# Patient Record
Sex: MALE | Race: WHITE
[De-identification: names, ages, dates, MRNs, and addresses within clinical notes are randomized per-mention and may not be internally consistent; named-entity substitution may affect disease eponyms.]

---

## 2019-01-15 ENCOUNTER — HOSPITAL ENCOUNTER (OUTPATIENT)
Dept: HOSPITAL 19 - MHCPAIN | Age: 69
End: 2019-01-15
Attending: ANESTHESIOLOGY
Payer: MEDICARE

## 2019-01-15 DIAGNOSIS — M47.812: ICD-10-CM

## 2019-01-15 DIAGNOSIS — G89.29: Primary | ICD-10-CM

## 2019-01-15 DIAGNOSIS — M54.12: ICD-10-CM

## 2019-01-15 DIAGNOSIS — M96.1: ICD-10-CM

## 2019-01-15 PROCEDURE — G0463 HOSPITAL OUTPT CLINIC VISIT: HCPCS

## 2021-02-11 ENCOUNTER — HOSPITAL ENCOUNTER (OUTPATIENT)
Dept: HOSPITAL 19 - COL.RAD | Age: 71
End: 2021-02-11
Attending: ORTHOPAEDIC SURGERY
Payer: MEDICARE

## 2021-02-11 DIAGNOSIS — M24.151: Primary | ICD-10-CM

## 2021-02-11 PROCEDURE — A9585 GADOBUTROL INJECTION: HCPCS

## 2021-05-27 ENCOUNTER — HOSPITAL ENCOUNTER (INPATIENT)
Dept: HOSPITAL 19 - COL.ER | Age: 71
LOS: 7 days | Discharge: NURSING, REHAB OR LONG-TERM CARE | DRG: 551 | End: 2021-06-03
Attending: INTERNAL MEDICINE | Admitting: INTERNAL MEDICINE
Payer: MEDICARE

## 2021-05-27 VITALS — SYSTOLIC BLOOD PRESSURE: 125 MMHG | TEMPERATURE: 97.7 F | HEART RATE: 90 BPM | DIASTOLIC BLOOD PRESSURE: 54 MMHG

## 2021-05-27 VITALS — WEIGHT: 135.36 LBS | HEIGHT: 67.01 IN | BODY MASS INDEX: 21.25 KG/M2

## 2021-05-27 VITALS — HEART RATE: 107 BPM | DIASTOLIC BLOOD PRESSURE: 46 MMHG | SYSTOLIC BLOOD PRESSURE: 112 MMHG

## 2021-05-27 VITALS — HEART RATE: 93 BPM | DIASTOLIC BLOOD PRESSURE: 49 MMHG | SYSTOLIC BLOOD PRESSURE: 124 MMHG

## 2021-05-27 VITALS — SYSTOLIC BLOOD PRESSURE: 113 MMHG | DIASTOLIC BLOOD PRESSURE: 47 MMHG | HEART RATE: 106 BPM

## 2021-05-27 VITALS — SYSTOLIC BLOOD PRESSURE: 123 MMHG | DIASTOLIC BLOOD PRESSURE: 62 MMHG | HEART RATE: 107 BPM

## 2021-05-27 VITALS — DIASTOLIC BLOOD PRESSURE: 66 MMHG | SYSTOLIC BLOOD PRESSURE: 92 MMHG | HEART RATE: 89 BPM

## 2021-05-27 VITALS — DIASTOLIC BLOOD PRESSURE: 45 MMHG | HEART RATE: 137 BPM | SYSTOLIC BLOOD PRESSURE: 98 MMHG

## 2021-05-27 VITALS — SYSTOLIC BLOOD PRESSURE: 109 MMHG | DIASTOLIC BLOOD PRESSURE: 50 MMHG | HEART RATE: 112 BPM

## 2021-05-27 VITALS — SYSTOLIC BLOOD PRESSURE: 111 MMHG | HEART RATE: 99 BPM | DIASTOLIC BLOOD PRESSURE: 50 MMHG

## 2021-05-27 VITALS — HEART RATE: 116 BPM | SYSTOLIC BLOOD PRESSURE: 116 MMHG | DIASTOLIC BLOOD PRESSURE: 64 MMHG

## 2021-05-27 VITALS — HEART RATE: 105 BPM | DIASTOLIC BLOOD PRESSURE: 48 MMHG | SYSTOLIC BLOOD PRESSURE: 104 MMHG

## 2021-05-27 VITALS — HEART RATE: 96 BPM | SYSTOLIC BLOOD PRESSURE: 130 MMHG | DIASTOLIC BLOOD PRESSURE: 67 MMHG

## 2021-05-27 VITALS — DIASTOLIC BLOOD PRESSURE: 46 MMHG | SYSTOLIC BLOOD PRESSURE: 112 MMHG

## 2021-05-27 DIAGNOSIS — E43: ICD-10-CM

## 2021-05-27 DIAGNOSIS — M48.061: Primary | ICD-10-CM

## 2021-05-27 DIAGNOSIS — Z79.82: ICD-10-CM

## 2021-05-27 DIAGNOSIS — E83.52: ICD-10-CM

## 2021-05-27 DIAGNOSIS — I48.91: ICD-10-CM

## 2021-05-27 DIAGNOSIS — T39.1X1A: ICD-10-CM

## 2021-05-27 DIAGNOSIS — F32.9: ICD-10-CM

## 2021-05-27 DIAGNOSIS — G89.29: ICD-10-CM

## 2021-05-27 DIAGNOSIS — E11.9: ICD-10-CM

## 2021-05-27 DIAGNOSIS — Z98.1: ICD-10-CM

## 2021-05-27 DIAGNOSIS — Z79.84: ICD-10-CM

## 2021-05-27 DIAGNOSIS — E87.2: ICD-10-CM

## 2021-05-27 DIAGNOSIS — D64.9: ICD-10-CM

## 2021-05-27 DIAGNOSIS — E87.6: ICD-10-CM

## 2021-05-27 DIAGNOSIS — E87.1: ICD-10-CM

## 2021-05-27 DIAGNOSIS — G72.89: ICD-10-CM

## 2021-05-27 DIAGNOSIS — Z86.718: ICD-10-CM

## 2021-05-27 DIAGNOSIS — M54.9: ICD-10-CM

## 2021-05-27 DIAGNOSIS — I25.2: ICD-10-CM

## 2021-05-27 DIAGNOSIS — F17.210: ICD-10-CM

## 2021-05-27 LAB
ALBUMIN SERPL-MCNC: 3.6 GM/DL (ref 3.5–5)
ALP SERPL-CCNC: 621 U/L (ref 50–136)
ALT SERPL-CCNC: 28 U/L (ref 4–49)
ALT SERPL-CCNC: 32 U/L (ref 4–49)
ANION GAP SERPL CALC-SCNC: 19 MMOL/L (ref 7–16)
APAP SERPL-MCNC: < 10 UG/ML (ref 10–30)
AST SERPL-CCNC: 43 U/L (ref 15–37)
AST SERPL-CCNC: 46 U/L (ref 15–37)
BASE EXCESS BLDA CALC-SCNC: -14.5 MMOL/L (ref -2–2)
BILIRUB SERPL-MCNC: 0.6 MG/DL (ref 0–1)
BILIRUB SERPL-MCNC: 0.6 MG/DL (ref 0–1)
BUN SERPL-MCNC: 45 MG/DL (ref 9–20)
CALCIUM SERPL-MCNC: 10.4 MG/DL (ref 8.4–10.2)
CHLORIDE SERPL-SCNC: 104 MMOL/L (ref 98–107)
CO2 BLDA-SCNC: 9.8 MMOL/L
CO2 SERPL-SCNC: 10 MMOL/L (ref 22–30)
CREAT SERPL-SCNC: 1.41 UMOL/L (ref 0.66–1.25)
CRP SERPL-MCNC: 16 MG/DL (ref 0–0.9)
ERYTHROCYTE [DISTWIDTH] IN BLOOD BY AUTOMATED COUNT: 17.4 % (ref 11.5–14.5)
GLUCOSE SERPL-MCNC: 182 MG/DL (ref 74–106)
GLUCOSE UR QL STRIP.AUTO: (no result)
GRAN CASTS #/AREA URNS LPF: (no result) /LPF
HCO3 BLDA-SCNC: 9.2 MEQ/L (ref 22–26)
HCT VFR BLD AUTO: 37.1 % (ref 42–52)
HGB BLD-MCNC: 11.2 G/DL (ref 13.5–18)
INHALED O2 CONCENTRATION: 21 %
INR BLD: 1.2 (ref 0.8–3)
KETONES UR STRIP.AUTO-MCNC: (no result) MG/DL
LIPASE SERPL-CCNC: 83 U/L (ref 23–300)
LYMPHOCYTES NFR BLD MANUAL: 5 % (ref 20–51)
MAGNESIUM SERPL-MCNC: 2.1 MG/DL (ref 1.6–2.3)
MCH RBC QN AUTO: 31 PG (ref 27–31)
MCHC RBC AUTO-ENTMCNC: 30 G/DL (ref 33–37)
MCV RBC AUTO: 101 FL (ref 80–100)
METAMYELOCYTES NFR BLD MANUAL: 1 % (ref 0–0)
MONOCYTES NFR BLD: 4 % (ref 1.7–9.3)
NEUTS BAND NFR BLD: 5 % (ref 0–10)
NEUTS SEG NFR BLD MANUAL: 85 % (ref 42–75.2)
PCO2 BLDA: 17.7 MMHG (ref 35–45)
PH UR STRIP.AUTO: 5 [PH] (ref 5–8)
PHOSPHATE SERPL-MCNC: 2.9 MG/DL (ref 2.5–4.5)
PLATELET # BLD AUTO: 278 K/MM3 (ref 130–400)
PLATELET BLD QL SMEAR: NORMAL
PMV BLD AUTO: 9.7 FL (ref 7.4–10.4)
PO2 BLDA: 109.4 MMHG (ref 80–100)
POTASSIUM SERPL-SCNC: 4.5 MMOL/L (ref 3.4–5)
PROT SERPL-MCNC: 7.9 GM/DL (ref 6.4–8.2)
PROTHROMBIN TIME: 13.8 SECONDS (ref 9.7–12.8)
RBC # BLD AUTO: 3.67 M/MM3 (ref 4.2–5.6)
SAO2 % BLDA: 98.6 % (ref 92–100)
SODIUM SERPL-SCNC: 133 MMOL/L (ref 137–145)
SP GR UR STRIP.AUTO: 1.02 (ref 1–1.03)
UA DIPSTICK PNL UR STRIP.AUTO: (no result)
URN COLLECT METHOD CLASS: (no result)

## 2021-05-27 NOTE — NUR
TELE CALLED AND REPORTED PATIENT APPEARS TO BE IN A-FIB. PATIENT DENIES HX OF
A-FIB BUT STATED HE HAS HAD AN ISSUE WITH DVT IN PAST. ASYMPTOMATIC. RT CALLED
FOR EKG.  VITALS TAKEN AND HOSPITALIST N.P. CALLED. SEE ORDERS.

## 2021-05-27 NOTE — NUR
PATIENT ADMITED INTO ROOM 323 FROM ER. A&O. VSS. HOSPITALIST P.A. AT BEDSIDE.
HEAD TO TOE ASSESSMENT COMPLETE. ORIENTED TO ROOM. CALL LIGHT IN REACH. SEE
NEW ADMISSION ORDERS.

## 2021-05-27 NOTE — NUR
STARTED SECOND IV SITE INTO LEFT WRIST, 22 GAUGE, ON FIRST ATTEMPT. SEE NEW
MED ORDERS. HOSPITALIST AT BEDSIDE. BEDSIDE SHIFT REPORT GIVEN AND JOHN SALINAS
TAKING OVER CARE.

## 2021-05-27 NOTE — NUR
Patient in bed, bed bath provided. Thousandsticks provided to patient. Patient
repositioned at this time. Denies needs at this time.

## 2021-05-28 VITALS — HEART RATE: 91 BPM | SYSTOLIC BLOOD PRESSURE: 114 MMHG | DIASTOLIC BLOOD PRESSURE: 46 MMHG

## 2021-05-28 VITALS — DIASTOLIC BLOOD PRESSURE: 51 MMHG | HEART RATE: 94 BPM | TEMPERATURE: 99 F | SYSTOLIC BLOOD PRESSURE: 129 MMHG

## 2021-05-28 VITALS — SYSTOLIC BLOOD PRESSURE: 111 MMHG | DIASTOLIC BLOOD PRESSURE: 48 MMHG | HEART RATE: 91 BPM | TEMPERATURE: 98.3 F

## 2021-05-28 VITALS — DIASTOLIC BLOOD PRESSURE: 43 MMHG | HEART RATE: 95 BPM | SYSTOLIC BLOOD PRESSURE: 118 MMHG

## 2021-05-28 VITALS — SYSTOLIC BLOOD PRESSURE: 117 MMHG | HEART RATE: 88 BPM | TEMPERATURE: 98.2 F | DIASTOLIC BLOOD PRESSURE: 62 MMHG

## 2021-05-28 VITALS — HEART RATE: 96 BPM | SYSTOLIC BLOOD PRESSURE: 112 MMHG | DIASTOLIC BLOOD PRESSURE: 56 MMHG

## 2021-05-28 VITALS — DIASTOLIC BLOOD PRESSURE: 63 MMHG | HEART RATE: 77 BPM | SYSTOLIC BLOOD PRESSURE: 125 MMHG

## 2021-05-28 VITALS — SYSTOLIC BLOOD PRESSURE: 109 MMHG | HEART RATE: 89 BPM | DIASTOLIC BLOOD PRESSURE: 41 MMHG

## 2021-05-28 VITALS — DIASTOLIC BLOOD PRESSURE: 54 MMHG | TEMPERATURE: 98.4 F | HEART RATE: 68 BPM | SYSTOLIC BLOOD PRESSURE: 104 MMHG

## 2021-05-28 VITALS — SYSTOLIC BLOOD PRESSURE: 134 MMHG | TEMPERATURE: 98.3 F | DIASTOLIC BLOOD PRESSURE: 56 MMHG | HEART RATE: 93 BPM

## 2021-05-28 VITALS — SYSTOLIC BLOOD PRESSURE: 111 MMHG | HEART RATE: 91 BPM | DIASTOLIC BLOOD PRESSURE: 51 MMHG

## 2021-05-28 VITALS — SYSTOLIC BLOOD PRESSURE: 120 MMHG | DIASTOLIC BLOOD PRESSURE: 42 MMHG | HEART RATE: 91 BPM

## 2021-05-28 VITALS — SYSTOLIC BLOOD PRESSURE: 116 MMHG | DIASTOLIC BLOOD PRESSURE: 63 MMHG | HEART RATE: 72 BPM

## 2021-05-28 VITALS — DIASTOLIC BLOOD PRESSURE: 65 MMHG | SYSTOLIC BLOOD PRESSURE: 117 MMHG | HEART RATE: 85 BPM

## 2021-05-28 VITALS — HEART RATE: 89 BPM | SYSTOLIC BLOOD PRESSURE: 102 MMHG | DIASTOLIC BLOOD PRESSURE: 55 MMHG

## 2021-05-28 VITALS — SYSTOLIC BLOOD PRESSURE: 120 MMHG | HEART RATE: 110 BPM | DIASTOLIC BLOOD PRESSURE: 51 MMHG

## 2021-05-28 VITALS — SYSTOLIC BLOOD PRESSURE: 101 MMHG | HEART RATE: 91 BPM | DIASTOLIC BLOOD PRESSURE: 56 MMHG

## 2021-05-28 VITALS — SYSTOLIC BLOOD PRESSURE: 116 MMHG | HEART RATE: 94 BPM | DIASTOLIC BLOOD PRESSURE: 55 MMHG

## 2021-05-28 VITALS — DIASTOLIC BLOOD PRESSURE: 48 MMHG | HEART RATE: 102 BPM | SYSTOLIC BLOOD PRESSURE: 107 MMHG

## 2021-05-28 LAB
ALBUMIN SERPL-MCNC: 2.6 GM/DL (ref 3.5–5)
ALP SERPL-CCNC: 342 U/L (ref 50–136)
ALT SERPL-CCNC: 27 U/L (ref 4–49)
ANION GAP SERPL CALC-SCNC: 12 MMOL/L (ref 7–16)
AST SERPL-CCNC: 38 U/L (ref 15–37)
BILIRUB SERPL-MCNC: 0.5 MG/DL (ref 0–1)
BUN SERPL-MCNC: 19 MG/DL (ref 9–20)
CALCIUM SERPL-MCNC: 8.9 MG/DL (ref 8.4–10.2)
CHLORIDE SERPL-SCNC: 106 MMOL/L (ref 98–107)
CO2 SERPL-SCNC: 15 MMOL/L (ref 22–30)
CREAT SERPL-SCNC: 0.75 UMOL/L (ref 0.66–1.25)
EOSINOPHIL NFR BLD: 1 % (ref 0–4)
ERYTHROCYTE [DISTWIDTH] IN BLOOD BY AUTOMATED COUNT: 17.2 % (ref 11.5–14.5)
GLUCOSE SERPL-MCNC: 191 MG/DL (ref 74–106)
HCT VFR BLD AUTO: 27.5 % (ref 42–52)
HGB BLD-MCNC: 8.8 G/DL (ref 13.5–18)
INR BLD: 1.5 (ref 0.8–3)
LYMPHOCYTES NFR BLD MANUAL: 11 % (ref 20–51)
MAGNESIUM SERPL-MCNC: 1.8 MG/DL (ref 1.6–2.3)
MCH RBC QN AUTO: 32 PG (ref 27–31)
MCHC RBC AUTO-ENTMCNC: 32 G/DL (ref 33–37)
MCV RBC AUTO: 99 FL (ref 80–100)
MONOCYTES NFR BLD: 10 % (ref 1.7–9.3)
NEUTS BAND NFR BLD: 3 % (ref 0–10)
NEUTS SEG NFR BLD MANUAL: 75 % (ref 42–75.2)
PLATELET # BLD AUTO: 227 K/MM3 (ref 130–400)
PLATELET BLD QL SMEAR: NORMAL
PMV BLD AUTO: 10 FL (ref 7.4–10.4)
POTASSIUM SERPL-SCNC: 3.5 MMOL/L (ref 3.4–5)
PRE ALBUMIN: 12.9 MG/DL (ref 17.6–36)
PROT SERPL-MCNC: 6.1 GM/DL (ref 6.4–8.2)
PROTHROMBIN TIME: 16.7 SECONDS (ref 9.7–12.8)
RBC # BLD AUTO: 2.78 M/MM3 (ref 4.2–5.6)
SODIUM SERPL-SCNC: 133 MMOL/L (ref 137–145)

## 2021-05-28 NOTE — NUR
Patient has done well throughout the night resting between interruptions.
Cardizem continues infusing per orders, VS every 30 minutes. Patient
repositioned throughout the night. States his back pain/spasms feel a lot
better through the night with minimal pain when changining position. Denies
further needs at this time. Will report off to day shift.

## 2021-05-28 NOTE — NUR
Several visit attempts;  left card offering God's blessings and the
availability of spiritual care at our hospital.

## 2021-05-28 NOTE — NUR
Patient continues to sit up in chair. Resting.  He was up to stool. Cna
reports a loose stool. His vitals remains stable off cardizem drip. Tele on.
Pain continues to be managed well. Will monitor.

## 2021-05-28 NOTE — NUR
Awake, alert, oriented x 4, able to verbalize needs, talkative, notes
improvement w/i the last 3 days per patient, telemetry in use, call bell w/i
reach, no s/s of hypo/hyper glycemia, states that pain is well controlled with
current pain regimen, updated on plan of care.

## 2021-05-28 NOTE — NUR
Patient resting in bed, his nephew Angel at bedside. Patient very thankful for
cares, reports feeling so much better. Pain rate 2/10. Oxycodone seems to be
managing pain well. Vss, Tele on. Cardizem drip per orders. Patient made Npo
per cardiology. Held vitamins & insulin at this time since he is NPO. Ekg
completed. Echo also ordered. Kathryn with hospitalist rounded. Bobo castillo.

## 2021-05-28 NOTE — NUR
Patient sitting up, visiting on the phone. Patient was up to the commode.
loose stool. Slow on his feet. Did well with dinner. Denies nausea. Pain
continues to be managed. Will report off to night nurse

## 2021-05-28 NOTE — NUR
& Dr. Russo have rounded. New orders obtained. Cardizem drip stopped
& po amiorodone started. Insulin per orders. Patient tolerated a small amount
of lunch without nausea. Patient worked with therapy & is sitting up in chair
now. Pain elevated with sitting. Roxicodone per orders.

## 2021-05-28 NOTE — NUR
attended clinical rounds with the team and Hospitalist advised
patient that he would likely need rehab placement upon discharge. Patient is
in agreement. SW met with patient to discuss discharge planning. Patient lives
alone in [place] and sees Dr. Paul for primary care. Patient obtains
medications from [facility] and advised they deliver. Patient has a
walker at home and no other DME. Patient states he has had difficulty with
ADLS recently and agrees he needs rehab. SW reviewed options with patient and
he would like referrals sent to [facility] and [facility]. Patient
is interested in completing DPOA-HC and would like to designate his friend,
Angel and friend, Reid. TERESITA assisted patient in completing form and then TERESITA
and TERESITA Pineda provided witness signature. TERESITA placed copy in chart and provided
original and copies to patient. TERESITA contacted Sondra [facility] Director to give
referral. TERESITA then contacted Mario at [facility] and faxed referral. TERESITA also faxed
clinicals to [facility] as patient has [employer] and will need prior
authorization.
 
Discharge Plan: Pending screens from [facility] and [facility].

## 2021-05-28 NOTE — NUR
Patient 's and holding, patient in bed resting. Denies feeling
palpitations. Contacted Yvette HARVEY, increased cardizem drip to 10 ml/hr.

## 2021-05-29 VITALS — HEART RATE: 99 BPM | DIASTOLIC BLOOD PRESSURE: 61 MMHG | TEMPERATURE: 99.5 F | SYSTOLIC BLOOD PRESSURE: 114 MMHG

## 2021-05-29 VITALS — SYSTOLIC BLOOD PRESSURE: 139 MMHG | TEMPERATURE: 98.7 F | HEART RATE: 95 BPM | DIASTOLIC BLOOD PRESSURE: 76 MMHG

## 2021-05-29 VITALS — HEART RATE: 99 BPM | DIASTOLIC BLOOD PRESSURE: 73 MMHG | TEMPERATURE: 98.4 F | SYSTOLIC BLOOD PRESSURE: 130 MMHG

## 2021-05-29 VITALS — HEART RATE: 106 BPM | DIASTOLIC BLOOD PRESSURE: 58 MMHG | TEMPERATURE: 99.6 F | SYSTOLIC BLOOD PRESSURE: 124 MMHG

## 2021-05-29 VITALS — DIASTOLIC BLOOD PRESSURE: 67 MMHG | TEMPERATURE: 98.4 F | SYSTOLIC BLOOD PRESSURE: 128 MMHG | HEART RATE: 100 BPM

## 2021-05-29 VITALS — SYSTOLIC BLOOD PRESSURE: 115 MMHG | HEART RATE: 87 BPM | TEMPERATURE: 98.9 F | DIASTOLIC BLOOD PRESSURE: 61 MMHG

## 2021-05-29 LAB
ALBUMIN SERPL-MCNC: 2.6 GM/DL (ref 3.5–5)
ALP SERPL-CCNC: 318 U/L (ref 50–136)
ALT SERPL-CCNC: 46 U/L (ref 4–49)
ANION GAP SERPL CALC-SCNC: 7 MMOL/L (ref 7–16)
ANISOCYTOSIS BLD QL: (no result)
AST SERPL-CCNC: 86 U/L (ref 15–37)
BILIRUB SERPL-MCNC: 0.7 MG/DL (ref 0–1)
BUN SERPL-MCNC: 18 MG/DL (ref 9–20)
CALCIUM SERPL-MCNC: 9.4 MG/DL (ref 8.4–10.2)
CHLORIDE SERPL-SCNC: 100 MMOL/L (ref 98–107)
CO2 SERPL-SCNC: 23 MMOL/L (ref 22–30)
CREAT SERPL-SCNC: 0.72 UMOL/L (ref 0.66–1.25)
ERYTHROCYTE [DISTWIDTH] IN BLOOD BY AUTOMATED COUNT: 17.2 % (ref 11.5–14.5)
GLUCOSE SERPL-MCNC: 249 MG/DL (ref 74–106)
HCT VFR BLD AUTO: 26.3 % (ref 42–52)
HGB BLD-MCNC: 8.6 G/DL (ref 13.5–18)
HYPOCHROMIA BLD QL SMEAR: (no result)
INR BLD: 1.5 (ref 0.8–3)
LYMPHOCYTES NFR BLD MANUAL: 9 % (ref 20–51)
MAGNESIUM SERPL-MCNC: 1.6 MG/DL (ref 1.6–2.3)
MCH RBC QN AUTO: 31 PG (ref 27–31)
MCHC RBC AUTO-ENTMCNC: 33 G/DL (ref 33–37)
MCV RBC AUTO: 94 FL (ref 80–100)
MONOCYTES NFR BLD: 11 % (ref 1.7–9.3)
MYELOCYTES NFR BLD MANUAL: 2 % (ref 0–0)
NEUTS SEG NFR BLD MANUAL: 78 % (ref 42–75.2)
PHOSPHATE SERPL-MCNC: 1.4 MG/DL (ref 2.5–4.5)
PLATELET # BLD AUTO: 238 K/MM3 (ref 130–400)
PLATELET BLD QL SMEAR: NORMAL
PMV BLD AUTO: 10.1 FL (ref 7.4–10.4)
POTASSIUM SERPL-SCNC: 3.2 MMOL/L (ref 3.4–5)
PROT SERPL-MCNC: 6.1 GM/DL (ref 6.4–8.2)
PROTHROMBIN TIME: 17.1 SECONDS (ref 9.7–12.8)
RBC # BLD AUTO: 2.81 M/MM3 (ref 4.2–5.6)
SODIUM SERPL-SCNC: 130 MMOL/L (ref 137–145)

## 2021-05-29 NOTE — NUR
Awake, alert, oriented x 4, able to verbablize all needs, appetite improved,
pain managment ongoing, updated on plan of care.

## 2021-05-29 NOTE — NUR
Patient resting in bed. He has worked with therapy & ambulated in halls with
the walker & did well. Patient ate well for breakfast. denies nausea. Pain
increased, roxicodone for pain. Hospitalsit team rounded, orders obtained &
plan of care reviewed. Patient understanding and continues to be thankful for
cares given. Will continue to montior.

## 2021-05-29 NOTE — NUR
Patient resting in bed. Awake watching softball. Repositioned for comfort.
Reports elevated pain in his back. Requesting roxicodone. Medication per
orders. Patient had ice cream & peaches for lunch. Treated for elevated blood
sugar per orders. Will monitor.

## 2021-05-29 NOTE — NUR
Patient resting in bed. Eating dinner. insulin per scale. roxicodone manages
pain. Will report off to nightnurse

## 2021-05-30 VITALS — SYSTOLIC BLOOD PRESSURE: 140 MMHG | DIASTOLIC BLOOD PRESSURE: 68 MMHG | HEART RATE: 98 BPM | TEMPERATURE: 97.9 F

## 2021-05-30 VITALS — DIASTOLIC BLOOD PRESSURE: 60 MMHG | TEMPERATURE: 98.7 F | SYSTOLIC BLOOD PRESSURE: 112 MMHG | HEART RATE: 106 BPM

## 2021-05-30 VITALS — DIASTOLIC BLOOD PRESSURE: 55 MMHG | SYSTOLIC BLOOD PRESSURE: 90 MMHG | TEMPERATURE: 98.1 F | HEART RATE: 97 BPM

## 2021-05-30 VITALS — SYSTOLIC BLOOD PRESSURE: 132 MMHG | TEMPERATURE: 99.5 F | HEART RATE: 91 BPM | DIASTOLIC BLOOD PRESSURE: 66 MMHG

## 2021-05-30 VITALS — DIASTOLIC BLOOD PRESSURE: 70 MMHG | TEMPERATURE: 98.9 F | HEART RATE: 93 BPM | SYSTOLIC BLOOD PRESSURE: 136 MMHG

## 2021-05-30 VITALS — DIASTOLIC BLOOD PRESSURE: 77 MMHG | TEMPERATURE: 98 F | HEART RATE: 108 BPM | SYSTOLIC BLOOD PRESSURE: 137 MMHG

## 2021-05-30 LAB
ALBUMIN SERPL-MCNC: 2.6 GM/DL (ref 3.5–5)
ALP SERPL-CCNC: 261 U/L (ref 50–136)
ALT SERPL-CCNC: 73 U/L (ref 4–49)
ANION GAP SERPL CALC-SCNC: 6 MMOL/L (ref 7–16)
ANISOCYTOSIS BLD QL: (no result)
AST SERPL-CCNC: 84 U/L (ref 15–37)
BILIRUB SERPL-MCNC: 0.5 MG/DL (ref 0–1)
BUN SERPL-MCNC: 13 MG/DL (ref 9–20)
CALCIUM SERPL-MCNC: 8.9 MG/DL (ref 8.4–10.2)
CHLORIDE SERPL-SCNC: 99 MMOL/L (ref 98–107)
CO2 SERPL-SCNC: 26 MMOL/L (ref 22–30)
CREAT SERPL-SCNC: 0.63 UMOL/L (ref 0.66–1.25)
EOSINOPHIL NFR BLD: 1 % (ref 0–4)
ERYTHROCYTE [DISTWIDTH] IN BLOOD BY AUTOMATED COUNT: 17.4 % (ref 11.5–14.5)
GLUCOSE SERPL-MCNC: 173 MG/DL (ref 74–106)
HCT VFR BLD AUTO: 26.4 % (ref 42–52)
HGB BLD-MCNC: 8.9 G/DL (ref 13.5–18)
LYMPHOCYTES NFR BLD MANUAL: 14 % (ref 20–51)
MAGNESIUM SERPL-MCNC: 2 MG/DL (ref 1.6–2.3)
MCH RBC QN AUTO: 31 PG (ref 27–31)
MCHC RBC AUTO-ENTMCNC: 34 G/DL (ref 33–37)
MCV RBC AUTO: 93 FL (ref 80–100)
METAMYELOCYTES NFR BLD MANUAL: 1 % (ref 0–0)
MONOCYTES NFR BLD: 5 % (ref 1.7–9.3)
MYELOCYTES NFR BLD MANUAL: 1 % (ref 0–0)
NEUTS SEG NFR BLD MANUAL: 78 % (ref 42–75.2)
PHOSPHATE SERPL-MCNC: 2.3 MG/DL (ref 2.5–4.5)
PLATELET # BLD AUTO: 228 K/MM3 (ref 130–400)
PLATELET BLD QL SMEAR: NORMAL
PMV BLD AUTO: 10.2 FL (ref 7.4–10.4)
POTASSIUM SERPL-SCNC: 3.1 MMOL/L (ref 3.4–5)
PROT SERPL-MCNC: 6.1 GM/DL (ref 6.4–8.2)
RBC # BLD AUTO: 2.84 M/MM3 (ref 4.2–5.6)
SODIUM SERPL-SCNC: 130 MMOL/L (ref 137–145)

## 2021-05-30 NOTE — NUR
Patient sitting up in bed eating breakfast. denies nausea. Pain managed at
this time. awaiting lab results. iv per orders. Will monito.r

## 2021-05-30 NOTE — NUR
PT ASKING FOR MORE PAIN MEDS. TOO EARLY FOR OXYCODONE, MORPHINE 2MG IVP GIVEN
AT THIS TIME. REPOSITIONED IN BED.

## 2021-05-30 NOTE — NUR
Patient resting in bed. He had a few episodes of incontinence. Pericares
provided.  Tegan Ortiz was notifed of patient elevated blood sugars. Increased
dose of insulin given. Patient ate well for dinner. He had a light lunch, but
overall great PO intake. Patient reported less relief this afternoon after
oxycodone.  Lidoderm patch & flexril started after discussed this with
Tegan Ortiz & patient reported it did give relief to his chronic back pain. Iv
continues per orders. K+ replaced today per orders.  Patient was able to
ambulate in halls with therapy. Refused to Sit up in chair reports it is too
uncomfortable for his back. Patient was repostioned in bed throughoutshift.
Will report off to nightnurse

## 2021-05-30 NOTE — NUR
PT IN BED. USES URINAL WITHOUT PROBLEM. IS ALERT AND ORIENTED X3. IVF INFUSING
TO LEFT WRIST WITHOUT PROBLEM. TAKES HS MEDS INCLUDING OXYCODONE 5MG PO FOR
BACK PAIN. ASSISTED WITH REPOSITIONING IN BED.

## 2021-05-31 VITALS — HEART RATE: 105 BPM | TEMPERATURE: 99.8 F | DIASTOLIC BLOOD PRESSURE: 59 MMHG | SYSTOLIC BLOOD PRESSURE: 114 MMHG

## 2021-05-31 VITALS — DIASTOLIC BLOOD PRESSURE: 86 MMHG | TEMPERATURE: 97.9 F | SYSTOLIC BLOOD PRESSURE: 138 MMHG | HEART RATE: 102 BPM

## 2021-05-31 VITALS — TEMPERATURE: 98.9 F | HEART RATE: 98 BPM | SYSTOLIC BLOOD PRESSURE: 119 MMHG | DIASTOLIC BLOOD PRESSURE: 57 MMHG

## 2021-05-31 VITALS — TEMPERATURE: 97.9 F | SYSTOLIC BLOOD PRESSURE: 127 MMHG | DIASTOLIC BLOOD PRESSURE: 56 MMHG | HEART RATE: 94 BPM

## 2021-05-31 VITALS — TEMPERATURE: 98.7 F | DIASTOLIC BLOOD PRESSURE: 74 MMHG | HEART RATE: 105 BPM | SYSTOLIC BLOOD PRESSURE: 145 MMHG

## 2021-05-31 VITALS — DIASTOLIC BLOOD PRESSURE: 64 MMHG | SYSTOLIC BLOOD PRESSURE: 134 MMHG | TEMPERATURE: 98.2 F | HEART RATE: 87 BPM

## 2021-05-31 LAB
ALBUMIN SERPL-MCNC: 2.3 GM/DL (ref 3.5–5)
ALP SERPL-CCNC: 219 U/L (ref 50–136)
ALT SERPL-CCNC: 52 U/L (ref 4–49)
ANION GAP SERPL CALC-SCNC: 4 MMOL/L (ref 7–16)
ANISOCYTOSIS BLD QL: (no result)
AST SERPL-CCNC: 43 U/L (ref 15–37)
BILIRUB SERPL-MCNC: 0.4 MG/DL (ref 0–1)
BUN SERPL-MCNC: 14 MG/DL (ref 9–20)
CALCIUM SERPL-MCNC: 8.6 MG/DL (ref 8.4–10.2)
CHLORIDE SERPL-SCNC: 98 MMOL/L (ref 98–107)
CO2 SERPL-SCNC: 29 MMOL/L (ref 22–30)
CREAT SERPL-SCNC: 0.62 UMOL/L (ref 0.66–1.25)
EOSINOPHIL NFR BLD: 6 % (ref 0–4)
ERYTHROCYTE [DISTWIDTH] IN BLOOD BY AUTOMATED COUNT: 17.5 % (ref 11.5–14.5)
GLUCOSE SERPL-MCNC: 160 MG/DL (ref 74–106)
HCT VFR BLD AUTO: 24.3 % (ref 42–52)
HCT VFR BLD AUTO: 25.4 % (ref 42–52)
HGB BLD-MCNC: 7.9 G/DL (ref 13.5–18)
HGB BLD-MCNC: 8.2 G/DL (ref 13.5–18)
LYMPHOCYTES NFR BLD MANUAL: 21 % (ref 20–51)
MAGNESIUM SERPL-MCNC: 1.7 MG/DL (ref 1.6–2.3)
MCH RBC QN AUTO: 31 PG (ref 27–31)
MCHC RBC AUTO-ENTMCNC: 33 G/DL (ref 33–37)
MCV RBC AUTO: 96 FL (ref 80–100)
MONOCYTES NFR BLD: 3 % (ref 1.7–9.3)
NEUTS BAND NFR BLD: 6 % (ref 0–10)
NEUTS SEG NFR BLD MANUAL: 64 % (ref 42–75.2)
PHOSPHATE SERPL-MCNC: 3 MG/DL (ref 2.5–4.5)
PLATELET # BLD AUTO: 236 K/MM3 (ref 130–400)
PLATELET BLD QL SMEAR: NORMAL
PMV BLD AUTO: 10.4 FL (ref 7.4–10.4)
POTASSIUM SERPL-SCNC: 3.1 MMOL/L (ref 3.4–5)
PROT SERPL-MCNC: 5.5 GM/DL (ref 6.4–8.2)
RBC # BLD AUTO: 2.54 M/MM3 (ref 4.2–5.6)
SODIUM SERPL-SCNC: 131 MMOL/L (ref 137–145)

## 2021-05-31 NOTE — NUR
Patient in bed eating breakfast. Alert and oriented x 3. Assessment complete.
Denies pain at this time. Heels elevated on pillows, SCDS to BLE. Acetadote
and fluids infusing per orders. Denies further needs at this time.

## 2021-05-31 NOTE — NUR
PT AWAKE, ALERT AND ORIENTED X4. HAS IVF TO RT WRIST AND ACETADOTE TO LEFT
FOREARM. VOIDING PER URINAL WITH OCCASIONAL INCONTINENCE EPISODES. MEDICATED
WITH OXYCODONE 5MG PO FOR BACK PAIN.

## 2021-05-31 NOTE — NUR
Patient doing well throughout the day, repositioned throughout the day. IV
initiated to left forarm. Medications given per orders for back pain. Patient
denies further needs at this time. Will report off to night shift.

## 2021-06-01 VITALS — DIASTOLIC BLOOD PRESSURE: 59 MMHG | HEART RATE: 90 BPM | SYSTOLIC BLOOD PRESSURE: 119 MMHG | TEMPERATURE: 99 F

## 2021-06-01 VITALS — TEMPERATURE: 98.6 F | SYSTOLIC BLOOD PRESSURE: 109 MMHG | DIASTOLIC BLOOD PRESSURE: 65 MMHG | HEART RATE: 102 BPM

## 2021-06-01 VITALS — TEMPERATURE: 99.9 F | SYSTOLIC BLOOD PRESSURE: 125 MMHG | HEART RATE: 108 BPM | DIASTOLIC BLOOD PRESSURE: 63 MMHG

## 2021-06-01 VITALS — HEART RATE: 99 BPM | SYSTOLIC BLOOD PRESSURE: 106 MMHG | DIASTOLIC BLOOD PRESSURE: 53 MMHG | TEMPERATURE: 97.8 F

## 2021-06-01 VITALS — HEART RATE: 88 BPM | SYSTOLIC BLOOD PRESSURE: 130 MMHG | TEMPERATURE: 99.4 F | DIASTOLIC BLOOD PRESSURE: 66 MMHG

## 2021-06-01 VITALS — DIASTOLIC BLOOD PRESSURE: 60 MMHG | SYSTOLIC BLOOD PRESSURE: 121 MMHG | TEMPERATURE: 98.8 F | HEART RATE: 98 BPM

## 2021-06-01 VITALS — TEMPERATURE: 98.8 F | HEART RATE: 90 BPM | DIASTOLIC BLOOD PRESSURE: 65 MMHG | SYSTOLIC BLOOD PRESSURE: 123 MMHG

## 2021-06-01 LAB
ALBUMIN SERPL-MCNC: 2.3 GM/DL (ref 3.5–5)
ALBUMIN SERPL-MCNC: 2.3 GM/DL (ref 3.5–5)
ALP SERPL-CCNC: 234 U/L (ref 50–136)
ALP SERPL-CCNC: 247 U/L (ref 50–136)
ALT SERPL-CCNC: 42 U/L (ref 4–49)
ALT SERPL-CCNC: 42 U/L (ref 4–49)
ANION GAP SERPL CALC-SCNC: 4 MMOL/L (ref 7–16)
AST SERPL-CCNC: 30 U/L (ref 15–37)
AST SERPL-CCNC: 33 U/L (ref 15–37)
BILIRUB DIRECT SERPL-MCNC: 0.1 MG/DL (ref 0–0.4)
BILIRUB INDIRECT SERPL-MCNC: 0.1 MG/DL (ref 0–1.1)
BILIRUB SERPL-MCNC: 0.2 MG/DL (ref 0–1)
BILIRUB SERPL-MCNC: 0.2 MG/DL (ref 0–1)
BUN SERPL-MCNC: 15 MG/DL (ref 9–20)
CALCIUM SERPL-MCNC: 8.7 MG/DL (ref 8.4–10.2)
CHLORIDE SERPL-SCNC: 96 MMOL/L (ref 98–107)
CO2 SERPL-SCNC: 31 MMOL/L (ref 22–30)
CREAT SERPL-SCNC: 0.59 UMOL/L (ref 0.66–1.25)
EOSINOPHIL NFR BLD: 1 % (ref 0–4)
ERYTHROCYTE [DISTWIDTH] IN BLOOD BY AUTOMATED COUNT: 17.9 % (ref 11.5–14.5)
GLUCOSE SERPL-MCNC: 214 MG/DL (ref 74–106)
HCT VFR BLD AUTO: 23.9 % (ref 42–52)
HGB BLD-MCNC: 7.8 G/DL (ref 13.5–18)
LYMPHOCYTES NFR BLD MANUAL: 16 % (ref 20–51)
MAGNESIUM SERPL-MCNC: 1.6 MG/DL (ref 1.6–2.3)
MCH RBC QN AUTO: 31 PG (ref 27–31)
MCHC RBC AUTO-ENTMCNC: 33 G/DL (ref 33–37)
MCV RBC AUTO: 95 FL (ref 80–100)
METAMYELOCYTES NFR BLD MANUAL: 1 % (ref 0–0)
MONOCYTES NFR BLD: 11 % (ref 1.7–9.3)
NEUTS BAND NFR BLD: 4 % (ref 0–10)
NEUTS SEG NFR BLD MANUAL: 67 % (ref 42–75.2)
NRBC BLD AUTO-RTO: 1 % (ref 0–6)
PHOSPHATE SERPL-MCNC: 3.7 MG/DL (ref 2.5–4.5)
PLATELET # BLD AUTO: 270 K/MM3 (ref 130–400)
PLATELET BLD QL SMEAR: NORMAL
PMV BLD AUTO: 10.5 FL (ref 7.4–10.4)
POTASSIUM SERPL-SCNC: 3.5 MMOL/L (ref 3.4–5)
PROT SERPL-MCNC: 5.3 GM/DL (ref 6.4–8.2)
PROT SERPL-MCNC: 5.6 GM/DL (ref 6.4–8.2)
RBC # BLD AUTO: 2.51 M/MM3 (ref 4.2–5.6)
SODIUM SERPL-SCNC: 130 MMOL/L (ref 137–145)

## 2021-06-01 NOTE — NUR
Patient complains of pain 4/10 to back. Medications given per orders. Denies
further needs at this time.

## 2021-06-01 NOTE — NUR
collaborated with [place], [facility] Director who advised they cannot
take patient at this time. TERESITA contacted [facility] and was advised patient
has authorization until 06/04 for discharge to [place] [facility]
(auth ID #[id number], [facility] ID #[id number]). TERESITA faxed clinical updates to
Mario who advised they can accept as long as patient has insurance auth. TERESITA
met with patient who is agreeable to discharge to [facility]. Patient to discharge
tomorrow.
 
Discharge Plan: [facility] SNF

## 2021-06-01 NOTE — NUR
Pt. sitting up in bed at this time. Pt. is A&OX3, assessment complete. INT to
lt. wrist patent.  INT to lt. forearm patent.  Pt. denies pain or other needs,
call light within reach.

## 2021-06-01 NOTE — NUR
Patient has done well throughout the day, pain medication given for lower back
pain throughout the day. Potassium infusing per orders. Patient encouraged to
increase activity. Denies needs at this time. Will report off to night shift

## 2021-06-01 NOTE — NUR
PT HAS BEEN AWAKE MOST OF NIGHT. MEDICATED WITH OXYCODONE 5MG PO AT THIS TIME.
IVF AND ACETADOTE DC'D.

## 2021-06-02 VITALS — HEART RATE: 90 BPM | TEMPERATURE: 98.3 F | SYSTOLIC BLOOD PRESSURE: 115 MMHG | DIASTOLIC BLOOD PRESSURE: 53 MMHG

## 2021-06-02 VITALS — SYSTOLIC BLOOD PRESSURE: 116 MMHG | HEART RATE: 93 BPM | TEMPERATURE: 97.3 F | DIASTOLIC BLOOD PRESSURE: 59 MMHG

## 2021-06-02 VITALS — HEART RATE: 87 BPM | SYSTOLIC BLOOD PRESSURE: 107 MMHG | DIASTOLIC BLOOD PRESSURE: 55 MMHG | TEMPERATURE: 98.2 F

## 2021-06-02 VITALS — DIASTOLIC BLOOD PRESSURE: 8 MMHG | HEART RATE: 91 BPM | TEMPERATURE: 97.7 F | SYSTOLIC BLOOD PRESSURE: 122 MMHG

## 2021-06-02 VITALS — HEART RATE: 87 BPM | DIASTOLIC BLOOD PRESSURE: 68 MMHG | TEMPERATURE: 98.4 F | SYSTOLIC BLOOD PRESSURE: 121 MMHG

## 2021-06-02 LAB
ANION GAP SERPL CALC-SCNC: 0 MMOL/L (ref 7–16)
ANISOCYTOSIS BLD QL: (no result)
BASOPHILS NFR BLD MANUAL: 2 % (ref 0–2)
BUN SERPL-MCNC: 16 MG/DL (ref 9–20)
CALCIUM SERPL-MCNC: 9.3 MG/DL (ref 8.4–10.2)
CHLORIDE SERPL-SCNC: 95 MMOL/L (ref 98–107)
CO2 SERPL-SCNC: 35 MMOL/L (ref 22–30)
CREAT SERPL-SCNC: 0.71 UMOL/L (ref 0.66–1.25)
EOSINOPHIL NFR BLD: 4 % (ref 0–4)
ERYTHROCYTE [DISTWIDTH] IN BLOOD BY AUTOMATED COUNT: 18.4 % (ref 11.5–14.5)
GLUCOSE SERPL-MCNC: 70 MG/DL (ref 74–106)
HCT VFR BLD AUTO: 23.6 % (ref 42–52)
HCT VFR BLD AUTO: 26.4 % (ref 42–52)
HGB BLD-MCNC: 7.5 G/DL (ref 13.5–18)
HGB BLD-MCNC: 8.4 G/DL (ref 13.5–18)
HYPOCHROMIA BLD QL SMEAR: (no result)
LYMPHOCYTES NFR BLD MANUAL: 12 % (ref 20–51)
MCH RBC QN AUTO: 31 PG (ref 27–31)
MCHC RBC AUTO-ENTMCNC: 32 G/DL (ref 33–37)
MCV RBC AUTO: 97 FL (ref 80–100)
MONOCYTES NFR BLD: 9 % (ref 1.7–9.3)
NEUTS BAND NFR BLD: 2 % (ref 0–10)
NEUTS SEG NFR BLD MANUAL: 71 % (ref 42–75.2)
PLATELET # BLD AUTO: 315 K/MM3 (ref 130–400)
PLATELET BLD QL SMEAR: NORMAL
PMV BLD AUTO: 10.2 FL (ref 7.4–10.4)
POTASSIUM SERPL-SCNC: 4 MMOL/L (ref 3.4–5)
RBC # BLD AUTO: 2.44 M/MM3 (ref 4.2–5.6)
SODIUM SERPL-SCNC: 130 MMOL/L (ref 137–145)

## 2021-06-02 NOTE — NUR
contacted Mario at [facility] to advise that
patient is not ready for discharge at this time, possibly tomorrow. SW faxed
clinical updates.

## 2021-06-02 NOTE — NUR
Patient doing well throughout the day. Encouraged increased activity. Patient
states pain 2/10 for lower back pain, educated patient on pain medications.
Patient denies other needs at this time. Will report off to night shift.

## 2021-06-02 NOTE — NUR
Patient in bed resting. Alert and oriented x 3, assessment complete. Patient
states pain to lower back 4/10; medications given per orders. INT to left
forarm and left wrist without complications. Patient denies needs at this
time.

## 2021-06-03 VITALS — SYSTOLIC BLOOD PRESSURE: 114 MMHG | HEART RATE: 90 BPM | TEMPERATURE: 99.1 F | DIASTOLIC BLOOD PRESSURE: 60 MMHG

## 2021-06-03 VITALS — SYSTOLIC BLOOD PRESSURE: 135 MMHG | DIASTOLIC BLOOD PRESSURE: 68 MMHG | TEMPERATURE: 98.6 F | HEART RATE: 95 BPM

## 2021-06-03 VITALS — DIASTOLIC BLOOD PRESSURE: 68 MMHG | HEART RATE: 95 BPM | SYSTOLIC BLOOD PRESSURE: 135 MMHG

## 2021-06-03 VITALS — HEART RATE: 85 BPM | DIASTOLIC BLOOD PRESSURE: 66 MMHG | TEMPERATURE: 98 F | SYSTOLIC BLOOD PRESSURE: 109 MMHG

## 2021-06-03 VITALS — SYSTOLIC BLOOD PRESSURE: 119 MMHG | HEART RATE: 80 BPM | TEMPERATURE: 98.6 F | DIASTOLIC BLOOD PRESSURE: 66 MMHG

## 2021-06-03 LAB
ANION GAP SERPL CALC-SCNC: 0 MMOL/L (ref 7–16)
ANISOCYTOSIS BLD QL: (no result)
BUN SERPL-MCNC: 17 MG/DL (ref 9–20)
CALCIUM SERPL-MCNC: 9.7 MG/DL (ref 8.4–10.2)
CHLORIDE SERPL-SCNC: 93 MMOL/L (ref 98–107)
CO2 SERPL-SCNC: 36 MMOL/L (ref 22–30)
CREAT SERPL-SCNC: 0.82 UMOL/L (ref 0.66–1.25)
EOSINOPHIL NFR BLD: 3 % (ref 0–4)
ERYTHROCYTE [DISTWIDTH] IN BLOOD BY AUTOMATED COUNT: 18.6 % (ref 11.5–14.5)
GLUCOSE SERPL-MCNC: 96 MG/DL (ref 74–106)
HCT VFR BLD AUTO: 24.3 % (ref 42–52)
HGB BLD-MCNC: 7.6 G/DL (ref 13.5–18)
HYPOCHROMIA BLD QL SMEAR: (no result)
LYMPHOCYTES NFR BLD MANUAL: 10 % (ref 20–51)
MCH RBC QN AUTO: 31 PG (ref 27–31)
MCHC RBC AUTO-ENTMCNC: 31 G/DL (ref 33–37)
MCV RBC AUTO: 98 FL (ref 80–100)
MONOCYTES NFR BLD: 19 % (ref 1.7–9.3)
NEUTS SEG NFR BLD MANUAL: 68 % (ref 42–75.2)
PLATELET # BLD AUTO: 364 K/MM3 (ref 130–400)
PLATELET BLD QL SMEAR: NORMAL
PMV BLD AUTO: 10.2 FL (ref 7.4–10.4)
POTASSIUM SERPL-SCNC: 4.4 MMOL/L (ref 3.4–5)
RBC # BLD AUTO: 2.49 M/MM3 (ref 4.2–5.6)
SODIUM SERPL-SCNC: 129 MMOL/L (ref 137–145)

## 2021-06-03 NOTE — NUR
Transportation arrived around 1500 for pt.  Pt did not want to get dressed, pt
left in a gown.  Report called

## 2021-06-03 NOTE — NUR
Pt doing well at this time.  He does have pain complaints in his lower back
and left hip.  He reports that at home it was 10/10, but that he would like to
get it to a 1 or 2.  Now he reports that it is a 3/10, but states that he is
doing okay.  Pt is aware that he will be transferring today.  Unsure of time
as of now.  Pt has had breakfast, denies any needs, will continue to monitor

## 2021-06-10 ENCOUNTER — HOSPITAL ENCOUNTER (OUTPATIENT)
Dept: HOSPITAL 19 - ZLAB.STJ | Age: 71
End: 2021-06-10
Attending: INTERNAL MEDICINE

## 2021-06-10 DIAGNOSIS — D64.9: Primary | ICD-10-CM

## 2021-06-10 LAB
ALBUMIN SERPL-MCNC: 3.8 GM/DL (ref 3.5–5)
ALP SERPL-CCNC: 285 U/L (ref 50–136)
ALT SERPL-CCNC: 22 U/L (ref 4–49)
ANION GAP SERPL CALC-SCNC: 6 MMOL/L (ref 7–16)
AST SERPL-CCNC: 33 U/L (ref 15–37)
BASOPHILS # BLD: 0.1 10*3/UL (ref 0–0.2)
BASOPHILS NFR BLD AUTO: 1 % (ref 0–2)
BILIRUB SERPL-MCNC: 0.7 MG/DL (ref 0–1)
BUN SERPL-MCNC: 13 MG/DL (ref 9–20)
CALCIUM SERPL-MCNC: 9.9 MG/DL (ref 8.4–10.2)
CHLORIDE SERPL-SCNC: 97 MMOL/L (ref 98–107)
CO2 SERPL-SCNC: 27 MMOL/L (ref 22–30)
CREAT SERPL-SCNC: 0.87 UMOL/L (ref 0.66–1.25)
EOSINOPHIL # BLD: 0.2 10*3/UL (ref 0–0.7)
EOSINOPHIL NFR BLD: 1.9 % (ref 0–4)
ERYTHROCYTE [DISTWIDTH] IN BLOOD BY AUTOMATED COUNT: 17.7 % (ref 11.5–14.5)
GLUCOSE SERPL-MCNC: 142 MG/DL (ref 74–106)
GRANULOCYTES # BLD AUTO: 71.1 % (ref 42.2–75.2)
HCT VFR BLD AUTO: 30.9 % (ref 42–52)
HGB BLD-MCNC: 9.6 G/DL (ref 13.5–18)
LYMPHOCYTES # BLD: 2 10*3/UL (ref 1.2–3.4)
LYMPHOCYTES NFR BLD: 18.1 % (ref 20–51)
MCH RBC QN AUTO: 30 PG (ref 27–31)
MCHC RBC AUTO-ENTMCNC: 31 G/DL (ref 33–37)
MCV RBC AUTO: 98 FL (ref 80–100)
MONOCYTES # BLD: 0.8 10*3/UL (ref 0.1–0.6)
MONOCYTES NFR BLD AUTO: 7.3 % (ref 1.7–9.3)
NEUTROPHILS # BLD: 8 10*3/UL (ref 1.4–6.5)
PLATELET # BLD AUTO: 530 K/MM3 (ref 130–400)
PMV BLD AUTO: 9.4 FL (ref 7.4–10.4)
POTASSIUM SERPL-SCNC: 4.8 MMOL/L (ref 3.4–5)
PROT SERPL-MCNC: 8.8 GM/DL (ref 6.4–8.2)
RBC # BLD AUTO: 3.15 M/MM3 (ref 4.2–5.6)
SODIUM SERPL-SCNC: 130 MMOL/L (ref 137–145)

## 2021-06-29 ENCOUNTER — HOSPITAL ENCOUNTER (EMERGENCY)
Dept: HOSPITAL 19 - COL.ER | Age: 71
Discharge: HOME | End: 2021-06-29
Attending: PERSONAL EMERGENCY RESPONSE ATTENDANT
Payer: MEDICARE

## 2021-06-29 VITALS — SYSTOLIC BLOOD PRESSURE: 110 MMHG | DIASTOLIC BLOOD PRESSURE: 60 MMHG | HEART RATE: 63 BPM

## 2021-06-29 VITALS — HEIGHT: 65.98 IN | WEIGHT: 135.36 LBS | BODY MASS INDEX: 21.75 KG/M2

## 2021-06-29 VITALS — TEMPERATURE: 98.1 F

## 2021-06-29 DIAGNOSIS — S01.81XA: ICD-10-CM

## 2021-06-29 DIAGNOSIS — I25.2: ICD-10-CM
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
## 2021-06-29 DIAGNOSIS — S09.90XA: Primary | ICD-10-CM

## 2021-06-29 DIAGNOSIS — Z79.01: ICD-10-CM

## 2021-06-29 DIAGNOSIS — I48.91: ICD-10-CM

## 2021-07-06 ENCOUNTER — HOSPITAL ENCOUNTER (OUTPATIENT)
Dept: HOSPITAL 19 - ZLAB.STJ | Age: 71
End: 2021-07-06
Attending: INTERNAL MEDICINE
Payer: MEDICARE

## 2021-07-06 DIAGNOSIS — R31.9: Primary | ICD-10-CM

## 2021-07-06 LAB
GLUCOSE UR QL STRIP.AUTO: (no result)
GRAN CASTS #/AREA URNS LPF: >12 /LPF
PH UR STRIP.AUTO: 5 [PH] (ref 5–8)
RBC # UR: (no result) /HPF
SP GR UR STRIP.AUTO: 1.02 (ref 1–1.03)
SQUAMOUS # URNS: (no result) /HPF
UA DIPSTICK PNL UR STRIP.AUTO: (no result)
URN COLLECT METHOD CLASS: (no result)
UROBILINOGEN UR STRIP.AUTO-MCNC: 2 MG/DL
